# Patient Record
Sex: FEMALE | Race: WHITE | NOT HISPANIC OR LATINO | URBAN - METROPOLITAN AREA
[De-identification: names, ages, dates, MRNs, and addresses within clinical notes are randomized per-mention and may not be internally consistent; named-entity substitution may affect disease eponyms.]

---

## 2017-02-08 ENCOUNTER — MYC MEDICAL ADVICE (OUTPATIENT)
Dept: OBGYN | Facility: CLINIC | Age: 42
End: 2017-02-08

## 2017-02-08 DIAGNOSIS — N91.2 AMENORRHEA: Primary | ICD-10-CM

## 2017-02-09 RX ORDER — MEDROXYPROGESTERONE ACETATE 10 MG
10 TABLET ORAL DAILY
Qty: 7 TABLET | Refills: 2 | Status: SHIPPED | OUTPATIENT
Start: 2017-02-09 | End: 2017-08-30

## 2017-02-09 NOTE — TELEPHONE ENCOUNTER
Notes per Dr. Worrell at last office visit related to diagnosis on 10-28-16:  Plan and Recommendations: I reviewed the condition, causes, differential diagnosis, prognosis, evaluation and management considerations and options.  Questions answered and information given.  See orders.  Advise observe menses now and repeat Provera prn if no menses x 12 weeks and not pregnant to avoid prolonged DUB.      Appears it may have been 15 wks since last period.    Will route to Dr. Worrell for review & orders. Olivia Mcdonald RN, BAN

## 2017-02-09 NOTE — TELEPHONE ENCOUNTER
Advise Provera 10 mg/d x 7d if not pregnant to induce menses. Rx sent with refills. Please notify.   Candido Worrell MD

## 2017-03-22 ENCOUNTER — MYC MEDICAL ADVICE (OUTPATIENT)
Dept: OBGYN | Facility: CLINIC | Age: 42
End: 2017-03-22

## 2017-03-22 NOTE — TELEPHONE ENCOUNTER
I have extended the order for the HSG test. It should still be done and on cycle day 6-8 while abstinent that cycle to do the test. Please notify.   Candido Worrell MD

## 2017-03-22 NOTE — TELEPHONE ENCOUNTER
Noted Dr. Worrell placed orders for HSG on 10-28-16.  Can reply to pt with details but want to make sure that he still wants pt to do the test and order should still be valid as < 6 mos old.    Will route to Dr. Worrell for review & orders. Olivia Mcdonald RN, BAN

## 2017-03-27 ENCOUNTER — RADIANT APPOINTMENT (OUTPATIENT)
Dept: GENERAL RADIOLOGY | Facility: CLINIC | Age: 42
End: 2017-03-27
Attending: OBSTETRICS & GYNECOLOGY
Payer: COMMERCIAL

## 2017-03-27 DIAGNOSIS — Z31.41 FERTILITY TESTING: ICD-10-CM

## 2017-03-27 PROCEDURE — 58340 CATHETER FOR HYSTEROGRAPHY: CPT

## 2017-03-27 PROCEDURE — 74740 X-RAY FEMALE GENITAL TRACT: CPT

## 2017-03-27 RX ORDER — IOPAMIDOL 510 MG/ML
50 INJECTION, SOLUTION INTRAVASCULAR ONCE
Status: COMPLETED | OUTPATIENT
Start: 2017-03-27 | End: 2017-03-27

## 2017-03-27 RX ADMIN — IOPAMIDOL 50 ML: 510 INJECTION, SOLUTION INTRAVASCULAR at 15:02

## 2017-06-30 DIAGNOSIS — J45.40 MODERATE PERSISTENT ASTHMA WITHOUT COMPLICATION: ICD-10-CM

## 2017-06-30 NOTE — TELEPHONE ENCOUNTER
albuterol (ALBUTEROL) 108 (90 BASE) MCG/ACT inhaler       Last Written Prescription Date: 10/11/16  Last Fill Quantity: 1, # refills: 1    Last Office Visit with [facility], [facility] or [facility] prescribing provider:  10/11/16   Future Office Visit:       Date of Last Asthma Action Plan Letter:   Asthma Action Plan Q1 Year    Topic Date Due     Asthma Action Plan - yearly  10/11/2017      Asthma Control Test:   ACT Total Scores 10/11/2016   ACT TOTAL SCORE -   ASTHMA ER VISITS -   ASTHMA HOSPITALIZATIONS -   ACT TOTAL SCORE (Goal Greater than or Equal to 20) 16   In the past 12 months, how many times did you visit the emergency room for your asthma without being admitted to the hospital? 0   In the past 12 months, how many times were you hospitalized overnight because of your asthma? 0       Date of Last Spirometry Test:   No results found for this or any previous visit.            Kanu Faarax  [facility]

## 2017-07-02 RX ORDER — ALBUTEROL SULFATE 90 UG/1
AEROSOL, METERED RESPIRATORY (INHALATION)
Qty: 18 G | Refills: 0 | Status: SHIPPED | OUTPATIENT
Start: 2017-07-02 | End: 2017-11-18

## 2017-07-03 NOTE — TELEPHONE ENCOUNTER
"Patient calling on status of refill. She is leaving for [place] at 7 AM and is concerned she will need albuterol while there secondary to \"allergy season\". Patient misplaced other inhaler. Refilled x 1 inhaler. Per refill protocol.  "

## 2017-08-14 ENCOUNTER — MYC MEDICAL ADVICE (OUTPATIENT)
Dept: OBGYN | Facility: CLINIC | Age: 42
End: 2017-08-14

## 2017-08-14 NOTE — TELEPHONE ENCOUNTER
Noted nl HSG on 03-27-17.   Noted patient was last seen by Dr. Worrell on 10-28-16 for DUB.  Patient sent [employer] message on 02-08-17 for amenorrhea.  Hemoglobin   Date Value Ref Range Status   10/11/2016 12.7 11.7 - 15.7 g/dL Final   ]  This RN wonders if patient needs to be seen in clinic to discuss and get tx plan.  Will route to Dr. Worrell for review & orders. Olivia Mcdonald RN, BAN

## 2017-08-15 NOTE — TELEPHONE ENCOUNTER
It is not clear what has transpired in the past 8 months. As discussed before, advise RE consult and management if still attempting pregnancy. If not pursuing this now, advise return appt here for E&M in the near future. Please notify.   Candido Worrell MD

## 2017-08-28 ENCOUNTER — TELEPHONE (OUTPATIENT)
Dept: OBGYN | Facility: CLINIC | Age: 42
End: 2017-08-28

## 2017-08-28 NOTE — TELEPHONE ENCOUNTER
..Reason for call:  Patient reporting a symptom    Symptom or request: heavy bleeding/menses issues - large clots    Duration (how long have symptoms been present): 2 months    Have you been treated for this before? Yes    Additional comments: hoping to be added on    Phone Number patient can be reached at:  Cell number on file:    Telephone Information:   Mobile [phone]       Best Time:  anytime    Can we leave a detailed message on this number:  YES    Call taken on 8/28/2017 at 9:58 AM by Elisabeth Ochoa

## 2017-08-28 NOTE — TELEPHONE ENCOUNTER
Reviewed chart and recent [employer] messages. Explained patient needs to be seen by Dr. Worrell to determine tx plan. Explained can work her into schedule on 08-30-17. Patient excited and appreciative as she was told no appts until mid September when she called to schedule an appt. Scheduled patient at 1015. Olivia Mcdonald RN, BAN

## 2017-08-30 ENCOUNTER — OFFICE VISIT (OUTPATIENT)
Dept: OBGYN | Facility: CLINIC | Age: 42
End: 2017-08-30
Payer: COMMERCIAL

## 2017-08-30 VITALS
DIASTOLIC BLOOD PRESSURE: 93 MMHG | WEIGHT: 221 LBS | SYSTOLIC BLOOD PRESSURE: 135 MMHG | BODY MASS INDEX: 35.67 KG/M2 | TEMPERATURE: 98.1 F | HEART RATE: 70 BPM

## 2017-08-30 DIAGNOSIS — N92.0 EXCESSIVE OR FREQUENT MENSTRUATION: Primary | ICD-10-CM

## 2017-08-30 DIAGNOSIS — N93.8 DUB (DYSFUNCTIONAL UTERINE BLEEDING): ICD-10-CM

## 2017-08-30 DIAGNOSIS — N91.2 AMENORRHEA: ICD-10-CM

## 2017-08-30 DIAGNOSIS — Z12.4 SCREENING FOR MALIGNANT NEOPLASM OF CERVIX: ICD-10-CM

## 2017-08-30 PROCEDURE — 99214 OFFICE O/P EST MOD 30 MIN: CPT | Performed by: OBSTETRICS & GYNECOLOGY

## 2017-08-30 PROCEDURE — G0145 SCR C/V CYTO,THINLAYER,RESCR: HCPCS | Performed by: OBSTETRICS & GYNECOLOGY

## 2017-08-30 PROCEDURE — G0476 HPV COMBO ASSAY CA SCREEN: HCPCS | Performed by: OBSTETRICS & GYNECOLOGY

## 2017-08-30 RX ORDER — MEDROXYPROGESTERONE ACETATE 10 MG
10 TABLET ORAL DAILY
Qty: 7 TABLET | Refills: 3 | Status: SHIPPED | OUTPATIENT
Start: 2017-08-30 | End: 2018-04-06

## 2017-08-30 NOTE — NURSING NOTE
"Chief Complaint   Patient presents with     Vaginal Bleeding     Follow-up to discuss       Initial BP (!) 135/93 (BP Location: Left arm, Patient Position: Chair, Cuff Size: Adult Regular)  Pulse 70  Temp 98.1  F (36.7  C) (Oral)  Wt 221 lb (100.2 kg)  LMP 08/01/2017  Breastfeeding? No  BMI 35.67 kg/m2 Estimated body mass index is 35.67 kg/(m^2) as calculated from the following:    Height as of 10/28/16: 5' 6\" (1.676 m).    Weight as of this encounter: 221 lb (100.2 kg).  Medication Reconciliation: complete   Estelita Ledezma CMA      "

## 2017-08-30 NOTE — PATIENT INSTRUCTIONS
If you have any questions regarding your visit, Please contact your care team.     [facility]: [phone]    [facility] CLINIC HOURS TELEPHONE NUMBER   HARINI Avila-    Sherley Baker-ZACHARIAH Capone-Medical Assistant   Monday-[place]  8:00a.m-4:45 p.m  Wednesday-[place] 8:00a.m-4:45 p.m.  Thursday-[place]  8:00a.m-4:45 p.m.  Friday-[place]  8:00a.m-4:45 p.m. [facility]  [address]  [place], [place] [address] [phone] ask [facility]  [phone] Fax  Imaging [phone]    [facility] Labor and Delivery  [address] [place] [address] Dr.  [place], [place] [address] [phone]    [facility]  [address] [place] [address]  [phone] ask [facility]  [phone] Fax  Imaging [phone]     Urgent Care locations:    [place]        [place] Monday-Friday  5 pm - 9 pm  Saturday and Sunday   9 am - 5 pm    Monday-Friday   11 am - 9 pm  Saturday and Sunday   9 am - 5 pm   [phone]       If you need a medication refill, please contact your pharmacy. Please allow 3 business days for your refill to be completed.  As always, Thank you for trusting us with your healthcare needs!

## 2017-08-30 NOTE — MR AVS SNAPSHOT
After Visit Summary   8/30/2017    Margarita Pretty    MRN: [id number]           Patient Information     Date Of Birth          1975        Visit Information        Provider Department      8/30/2017 10:15 AM Candido Worrell MD [facility]        Care Instructions                                                        If you have any questions regarding your visit, Please contact your care team.     [facility] Access Services: [phone]    [facility] CLINIC HOURS TELEPHONE NUMBER   Candido Worrell M.D.      Laura-    Sherley Baker-ZACHARIAH Capone-Medical Assistant   Monday-[place]  8:00a.m-4:45 p.m  Wednesday-[place] 8:00a.m-4:45 p.m.  Thursday-[place]  8:00a.m-4:45 p.m.  [facility]  8:00a.m-4:45 p.m. [facility]  [address] [place], [place] [address]J[place]anna  [place], [place] [address] [phone] [facility]  [phone] Fax  Imaging [phone]    [facility] Labor and Delivery  [address] [place] [address] Dr.  [place], [place] [address] [phone]    [facility]  [address] [place], [place] [address] [phone] [facility]  [phone] Fax  Imaging [phone]     Urgent Care locations:    [facility] Monday-Friday  5 pm - 9 pm  Saturday and Sunday   9 am - 5 pm    Monday-Friday   11 am - 9 pm  Saturday and Sunday   9 am - 5 pm   [phone]       If you need a medication refill, please contact your pharmacy. Please allow 3 business days for your refill to be completed.  As always, Thank you for trusting us with your healthcare needs!            Follow-ups after your visit        Your next 10 appointments already scheduled     Aug 30, 2017 10:15 AM CDT   Office Visit with Candido Worrell MD   [facility] ([facility])    [address] [facility] [address]   [phone]           Bring a  current list of meds and any records pertaining to this visit. For Physicals, please bring immunization records and any forms needing to be filled out. Please arrive 10 minutes early to complete paperwork.              Who to contact     If you have questions or need follow up information about today's clinic visit or your schedule please contact [facility] directly at [phone].  Normal or non-critical lab and imaging results will be communicated to you by [employer], letter or phone within 4 business days after the clinic has received the results. If you do not hear from us within 7 days, please contact the clinic through [employer] or phone. If you have a critical or abnormal lab result, we will notify you by phone as soon as possible.  Submit refill requests through [employer] or call your pharmacy and they will forward the refill request to us. Please allow 3 business days for your refill to be completed.          Additional Information About Your Visit        [employer] Information     [employer] gives you secure access to your electronic health record. If you see a primary care provider, you can also send messages to your care team and make appointments. If you have questions, please call your primary care clinic.  If you do not have a primary care provider, please call [phone] and they will assist you.        Care EveryWhere ID     This is your Care EveryWhere ID. This could be used by other organizations to access your [facility] medical records  [id number]        Your Vitals Were     Pulse Temperature Last Period Breastfeeding? BMI (Body Mass Index)       70 98.1  F (36.7  C) (Oral) 08/01/2017 No 35.67 kg/m2        Blood Pressure from Last 3 Encounters:   08/30/17 (!) 135/93   11/10/16 108/71   10/28/16 136/90    Weight from Last 3 Encounters:   08/30/17 221 lb (100.2 kg)   11/10/16 212 lb (96.2 kg)   10/28/16 209 lb (94.8 kg)              Today, you had the following     No orders found for  display       Primary Care Provider Office Phone # Fax #    Demario Bush [phone]       [address]  [facility] [address]        Equal Access to Services     LIZETT CHAVEZ : Hadii aad ku hadasho Soomaali, waaxda luqadaha, qaybta kaalmada adeegyada, waxdylan dickeyn luisa ulloa laLuluart glez. So [facility] [phone].    ATENCIÓN: Si habla español, tiene a mauro disposición servicios gratuitos de asistencia lingüística. Llame al [phone].    We comply with applicable federal civil rights laws and [place] laws. We do not discriminate on the basis of race, color, national origin, age, disability sex, sexual orientation or gender identity.            Thank you!     Thank you for choosing [facility]  for your care. Our goal is always to provide you with excellent care. Hearing back from our patients is one way we can continue to improve our services. Please take a few minutes to complete the written survey that you may receive in the mail after your visit with us. Thank you!             Your Updated Medication List - Protect others around you: Learn how to safely use, store and throw away your medicines at www.disposemymeds.org.          This list is accurate as of: 8/30/17 10:09 AM.  Always use your most recent med list.                   Brand Name Dispense Instructions for use Diagnosis    * albuterol (2.5 MG/3ML) 0.083% neb solution     60 vial    Take 1 vial (2.5 mg) by nebulization every 6 hours as needed for shortness of breath / dyspnea    Moderate persistent asthma       * VENTOLIN  (90 BASE) MCG/ACT Inhaler   Generic drug:  albuterol     18 g    INHALE 2 PUFFS INTO THE LUNGS EVERY 4 HOURS AS NEEDED FOR ASTHMA SYMPTOMS    Moderate persistent asthma without complication       beclomethasone 40 MCG/ACT Inhaler    QVAR    1 Inhaler    Inhale 2 puffs into the lungs 2 times daily for asthma prevention.    Moderate persistent asthma without complication        cetirizine 10 MG tablet    zyrTEC     Take 1 tablet by mouth every evening.        flunisolide HFA 80 MCG/ACT Aers oral inhaler    AEROSPAN    1 Inhaler    Inhale 2 puffs into the lungs 2 times daily for asthma prevention.    Moderate persistent asthma without complication       IBUPROFEN PO      Take 200 mg by mouth        medroxyPROGESTERone 10 MG tablet    PROVERA    7 tablet    Take 1 tablet (10 mg) by mouth daily    Amenorrhea       * Notice:  This list has 2 medication(s) that are the same as other medications prescribed for you. Read the directions carefully, and ask your doctor or other care provider to review them with you.

## 2017-08-31 ENCOUNTER — MYC MEDICAL ADVICE (OUTPATIENT)
Dept: OBGYN | Facility: CLINIC | Age: 42
End: 2017-08-31

## 2017-08-31 NOTE — PROGRESS NOTES
OB-GYN Problem-Oriented Visit or Consultation      Margarita Pretty is a 42 year old year old P 0 who presents with a chief complaint of menorrhagia.  Referred by self.  Patient's last menstrual period was 08/01/2017.    HPI:     See prior notes. No recurrent Bartholin abscess. Light smoker still and encouraged cessation. Stable relationship and open to pregnancy but infertility treatment too costly. Oligomenorrhea and dependent on Provera generally to induce menses. LNMP 3/27/17. Started spotting 6 weeks ago and this increased to heavy bleeding this past week and this has now stopped x 1 day. Contraceptive method is none.     Past medical, obstetrical, surgical, family and social history reviewed and as noted or updated in chart.     Allergies, meds and supplements are as noted or updated in chart.      ROS:   Systems reviewed include:  constitutional, gastrointestinal, genitourinary, psychological, hematologic/lymphatic and endocrine.    These systems were negative for significant symptoms except for the following additional: none; see HPI.    EXAM:  VS as noted.   Abd and Pelvis were  normal or negative except for, or in particular noting, the following  pertinent findings: none.      Assessment:   Encounter Diagnoses   Name Primary?     Excessive or frequent menstruation Yes     Screening for malignant neoplasm of cervix      Amenorrhea      DUB (dysfunctional uterine bleeding)      Plan and Recommendations: I reviewed the condition, causes, differential diagnosis, prognosis, evaluation and management considerations and options.  Questions answered and information given.  See orders.  Pregnancy doubtful. EMB deferred for now. Previous normal u/s and HSG.   Cyclic Provera now in 1 and then every 2-3 mo prn and if not pregnant.   Medications and prescriptions given as noted.  I reviewed side effects, risks, benefits and instructions on proper use.  Continue regular exams.      A/P:  Margarita was seen today for vaginal  bleeding.    Diagnoses and all orders for this visit:    Excessive or frequent menstruation    Screening for malignant neoplasm of cervix  -     Pap imaged thin layer screen with HPV - recommended age 30 - 65 years (select HPV order below)  -     HPV High Risk Types DNA Cervical    Amenorrhea  -     medroxyPROGESTERone (PROVERA) 10 MG tablet; Take 1 tablet (10 mg) by mouth daily    DUB (dysfunctional uterine bleeding)        Candido Worrell MD

## 2017-08-31 NOTE — TELEPHONE ENCOUNTER
Please indicate in a letter that patient has chronic problems with dysfunctional uterine bleeding requiring on-going medical treatment and desires to continue her care at [facility] with me and her other physicians and should benefit from this continuity of care. We would appreciate a waiver of her network restrictions to allow continued care here. Thank you.      Let patient know and send letter as indicated.   I had also sent the Provera prescription for use as directed.   Candido Worrell MD

## 2017-08-31 NOTE — LETTER
09/01/17    Margarita Adameud  [address]  [place] [address]      To whom it may concern:      Margarita has chronic problems with dysfunctional uterine bleeding requiring on-going medical treatment and desires to continue her care at [facility] with me and her other physicians and should benefit from this continuity of care. We would appreciate a waiver of her network restrictions to allow continued care here. Thank you.          Sincerely,      Candido Worerll MD

## 2017-08-31 NOTE — LETTER
09/01/17    Margarita Adameud  [address]  [place] [address]      To whom it may concern:  Margarita has chronic problems with dysfunctional uterine bleeding requiring on-going medical treatment and desires to continue her care at [facility] with me and her other physicians and should benefit from this continuity of care. We would appreciate a waiver of her network restrictions to allow continued care here. Thank you.          Sincerely,      Candido Worrell MD

## 2017-09-01 LAB
COPATH REPORT: NORMAL
PAP: NORMAL

## 2017-09-06 LAB
FINAL DIAGNOSIS: NORMAL
HPV HR 12 DNA CVX QL NAA+PROBE: NEGATIVE
HPV16 DNA SPEC QL NAA+PROBE: NEGATIVE
HPV18 DNA SPEC QL NAA+PROBE: NEGATIVE
SPECIMEN DESCRIPTION: NORMAL

## 2017-11-18 DIAGNOSIS — J45.40 MODERATE PERSISTENT ASTHMA WITHOUT COMPLICATION: ICD-10-CM

## 2017-11-21 NOTE — TELEPHONE ENCOUNTER
Routing refill request to provider for review/approval because:  Patient needs to be seen because it has been more than 1 year since last office visit.  Act is due      Keiry Fonseca RN - BC

## 2017-11-21 NOTE — TELEPHONE ENCOUNTER
Let patient know she needs to be seen for further refills. (failing Asthma and hasn't been seen for over a year)

## 2017-11-22 RX ORDER — ALBUTEROL SULFATE 90 UG/1
AEROSOL, METERED RESPIRATORY (INHALATION)
Qty: 18 G | Refills: 11 | Status: SHIPPED | OUTPATIENT
Start: 2017-11-22

## 2017-11-22 NOTE — TELEPHONE ENCOUNTER
Call to patient to inform that her  Provider would like her seen in clinic before refill of   Medication.     She says that she does not have insurance coverage at this time.     She is out of the medication and wondering what she should do, until she can get in.       Johanna Blake CMA

## 2017-11-22 NOTE — TELEPHONE ENCOUNTER
Spoke to patient inhaler was refilled and she will schedule an appointment in the next month when she gets insurance.  Marilee GILLIS

## 2017-12-26 ENCOUNTER — MYC MEDICAL ADVICE (OUTPATIENT)
Dept: OBGYN | Facility: CLINIC | Age: 42
End: 2017-12-26

## 2018-01-09 ENCOUNTER — RADIANT APPOINTMENT (OUTPATIENT)
Dept: MAMMOGRAPHY | Facility: CLINIC | Age: 43
End: 2018-01-09
Attending: OBSTETRICS & GYNECOLOGY
Payer: COMMERCIAL

## 2018-01-09 DIAGNOSIS — Z12.31 VISIT FOR SCREENING MAMMOGRAM: ICD-10-CM

## 2018-01-09 PROCEDURE — 77063 BREAST TOMOSYNTHESIS BI: CPT

## 2018-01-09 PROCEDURE — 77067 SCR MAMMO BI INCL CAD: CPT

## 2018-01-22 ENCOUNTER — APPOINTMENT (OUTPATIENT)
Dept: OPTOMETRY | Facility: CLINIC | Age: 43
End: 2018-01-22
Payer: COMMERCIAL

## 2018-01-22 ENCOUNTER — OFFICE VISIT (OUTPATIENT)
Dept: OPTOMETRY | Facility: CLINIC | Age: 43
End: 2018-01-22
Payer: COMMERCIAL

## 2018-01-22 DIAGNOSIS — H04.123 INSUFFICIENCY OF TEAR FILM OF BOTH EYES: ICD-10-CM

## 2018-01-22 DIAGNOSIS — H52.13 MYOPIA OF BOTH EYES: Primary | ICD-10-CM

## 2018-01-22 DIAGNOSIS — H10.13 ALLERGIC CONJUNCTIVITIS OF BOTH EYES: ICD-10-CM

## 2018-01-22 PROCEDURE — 92340 FIT SPECTACLES MONOFOCAL: CPT | Performed by: OPTOMETRIST

## 2018-01-22 PROCEDURE — 92015 DETERMINE REFRACTIVE STATE: CPT | Performed by: OPTOMETRIST

## 2018-01-22 PROCEDURE — 92004 COMPRE OPH EXAM NEW PT 1/>: CPT | Performed by: OPTOMETRIST

## 2018-01-22 RX ORDER — AZELASTINE HYDROCHLORIDE 0.5 MG/ML
1 SOLUTION/ DROPS OPHTHALMIC 2 TIMES DAILY
Qty: 1 BOTTLE | Refills: 6 | Status: SHIPPED | OUTPATIENT
Start: 2018-01-22

## 2018-01-22 ASSESSMENT — VISUAL ACUITY
OD_SC: 20/20-1
METHOD: SNELLEN - LINEAR
OS_PH_SC: 20/20-2
OS_SC: 20/60
OS_SC: 20/20-1
OD_SC: 20/30-2

## 2018-01-22 ASSESSMENT — SLIT LAMP EXAM - LIDS
COMMENTS: SLIGHT PTOSIS
COMMENTS: NORMAL

## 2018-01-22 ASSESSMENT — EXTERNAL EXAM - RIGHT EYE: OD_EXAM: NORMAL

## 2018-01-22 ASSESSMENT — REFRACTION_MANIFEST
OS_SPHERE: -1.25
OD_CYLINDER: +0.25
OS_CYLINDER: SPHERE
OD_AXIS: 060
OD_SPHERE: -1.00

## 2018-01-22 ASSESSMENT — CONF VISUAL FIELD
OD_NORMAL: 1
OS_NORMAL: 1

## 2018-01-22 ASSESSMENT — TONOMETRY
OD_IOP_MMHG: 17
OS_IOP_MMHG: 17
IOP_METHOD: TONOPEN

## 2018-01-22 ASSESSMENT — CUP TO DISC RATIO
OD_RATIO: 0.2
OS_RATIO: 0.2

## 2018-01-22 ASSESSMENT — EXTERNAL EXAM - LEFT EYE: OS_EXAM: NORMAL

## 2018-01-22 NOTE — MR AVS SNAPSHOT
After Visit Summary   1/22/2018    Margarita Pretty    MRN: [id number]           Patient Information     Date Of Birth          1975        Visit Information        Provider Department      1/22/2018 4:00 PM Eric Zepeda, OD [facility]        Today's Diagnoses     Myopia of both eyes    -  1    Allergic conjunctivitis of both eyes        Insufficiency of tear film of both eyes          Care Instructions    Eyeglass prescription given.  Glasses for distance vision only.    Optivar- 1 drop both eyes 2 x day as needed for itchy eyes.    Systane Ultra 1 drop both eyes 3 x day- 4 x day daily.    Return in 1 year for a complete eye exam or sooner if needed.    Eric Zepeda, TY              Follow-ups after your visit        Follow-up notes from your care team     Return in about 1 year (around 1/22/2019) for Annual Visit.      Who to contact     If you have questions or need follow up information about today's clinic visit or your schedule please contact [facility] directly at [phone].  Normal or non-critical lab and imaging results will be communicated to you by Codasystemhart, letter or phone within 4 business days after the clinic has received the results. If you do not hear from us within 7 days, please contact the clinic through Triboldt or phone. If you have a critical or abnormal lab result, we will notify you by phone as soon as possible.  Submit refill requests through Zoodak or call your pharmacy and they will forward the refill request to us. Please allow 3 business days for your refill to be completed.          Additional Information About Your Visit        Codasystemhart Information     Zoodak gives you secure access to your electronic health record. If you see a primary care provider, you can also send messages to your care team and make appointments. If you have questions, please call your primary care clinic.  If you do not have a primary care provider,  please call [phone] and they will assist you.        Care EveryWhere ID     This is your Care EveryWhere ID. This could be used by other organizations to access your [place] medical records  [id number]         Blood Pressure from Last 3 Encounters:   08/30/17 (!) 135/93   11/10/16 108/71   10/28/16 136/90    Weight from Last 3 Encounters:   08/30/17 100.2 kg (221 lb)   11/10/16 96.2 kg (212 lb)   10/28/16 94.8 kg (209 lb)              We Performed the Following     EYE EXAM (SIMPLE-NONBILLABLE)     REFRACTION          Today's Medication Changes          These changes are accurate as of: 1/22/18  5:15 PM.  If you have any questions, ask your nurse or doctor.               Start taking these medicines.        Dose/Directions    azelastine 0.05 % Soln ophthalmic solution   Commonly known as:  OPTIVAR   Used for:  Allergic conjunctivitis of both eyes   Started by:  Eric Zepeda, OD        Dose:  1 drop   Apply 1 drop to eye 2 times daily   Quantity:  1 Bottle   Refills:  6       polyethylene glycol 0.4%- propylene glycol 0.3% 0.4-0.3 % Soln ophthalmic solution   Commonly known as:  SYSTANE ULTRA   Used for:  Insufficiency of tear film of both eyes   Started by:  Eric Zepeda, OD        Dose:  1 drop   Place 1 drop into both eyes 4 times daily   Quantity:  6 mL   Refills:  12            Where to get your medicines      [facility] [address] [place] [address] [address] [place] [address] AT [facility]  [address] [facility] [address]    Hours:  24-hours Phone:  [phone]     azelastine 0.05 % Soln ophthalmic solution    polyethylene glycol 0.4%- propylene glycol 0.3% 0.4-0.3 % Soln ophthalmic solution                Primary Care Provider Office Phone # Fax #    Demario Bush [phone]       [address]  [place] [address]        Equal Access to Services     LIZETT CHAVEZ AH: tari Vital  sergeama moustapha jerome. So [place] [phone].    ATENCIÓN: Si anahi de la garza, tiene a mauro disposición servicios gratuitos de asistencia lingüística. Tay al [phone].    We comply with applicable federal civil rights laws and [place] laws. We do not discriminate on the basis of race, color, national origin, age, disability, sex, sexual orientation, or gender identity.            Thank you!     Thank you for choosing [facility]  for your care. Our goal is always to provide you with excellent care. Hearing back from our patients is one way we can continue to improve our services. Please take a few minutes to complete the written survey that you may receive in the mail after your visit with us. Thank you!             Your Updated Medication List - Protect others around you: Learn how to safely use, store and throw away your medicines at www.disposemymeds.org.          This list is accurate as of: 1/22/18  5:15 PM.  Always use your most recent med list.                   Brand Name Dispense Instructions for use Diagnosis    * albuterol (2.5 MG/3ML) 0.083% neb solution     60 vial    Take 1 vial (2.5 mg) by nebulization every 6 hours as needed for shortness of breath / dyspnea    Moderate persistent asthma       * VENTOLIN  (90 BASE) MCG/ACT Inhaler   Generic drug:  albuterol     18 g    INHALE 2 PUFFS INTO THE LUNGS EVERY 4 HOURS AS NEEDED FOR ASTHMA SYMPTOMS    Moderate persistent asthma without complication       azelastine 0.05 % Soln ophthalmic solution    OPTIVAR    1 Bottle    Apply 1 drop to eye 2 times daily    Allergic conjunctivitis of both eyes       beclomethasone 40 MCG/ACT Inhaler    QVAR    1 Inhaler    Inhale 2 puffs into the lungs 2 times daily for asthma prevention.    Moderate persistent asthma without complication       cetirizine 10 MG tablet    zyrTEC     Take 1 tablet by mouth every evening.        flunisolide  HFA 80 MCG/ACT Aers oral inhaler    AEROSPAN    1 Inhaler    Inhale 2 puffs into the lungs 2 times daily for asthma prevention.    Moderate persistent asthma without complication       IBUPROFEN PO      Take 200 mg by mouth        medroxyPROGESTERone 10 MG tablet    PROVERA    7 tablet    Take 1 tablet (10 mg) by mouth daily    Amenorrhea       polyethylene glycol 0.4%- propylene glycol 0.3% 0.4-0.3 % Soln ophthalmic solution    SYSTANE ULTRA    6 mL    Place 1 drop into both eyes 4 times daily    Insufficiency of tear film of both eyes       * Notice:  This list has 2 medication(s) that are the same as other medications prescribed for you. Read the directions carefully, and ask your doctor or other care provider to review them with you.
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
## 2018-01-22 NOTE — PATIENT INSTRUCTIONS
Eyeglass prescription given.  Glasses for distance vision only.    Optivar- 1 drop both eyes 2 x day as needed for itchy eyes.    Systane Ultra 1 drop both eyes 3 x day- 4 x day daily.    Return in 1 year for a complete eye exam or sooner if needed.    Eric Zepeda, OD
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
## 2018-04-06 ENCOUNTER — MYC MEDICAL ADVICE (OUTPATIENT)
Dept: OBGYN | Facility: CLINIC | Age: 43
End: 2018-04-06

## 2018-04-06 DIAGNOSIS — N91.2 AMENORRHEA: ICD-10-CM

## 2018-04-06 RX ORDER — MEDROXYPROGESTERONE ACETATE 10 MG
10 TABLET ORAL DAILY
Qty: 7 TABLET | Refills: 2 | Status: SHIPPED | OUTPATIENT
Start: 2018-04-06

## 2018-04-06 NOTE — TELEPHONE ENCOUNTER
Routed to Dr Worrell. Pharmacy loaded into system for [place].   Patient requesting refill as she is spending a couple of months there.    Last visit with Dr Worrell was 08/30/2017. No future appts made.  medroxyPROGESTERone (PROVERA) 10 MG tablet 7 tablet 3 8/30/2017  No   Sig: Take 1 tablet (10 mg) by mouth daily     Jeannine Ghosh RN

## 2020-02-10 ENCOUNTER — HEALTH MAINTENANCE LETTER (OUTPATIENT)
Age: 45
End: 2020-02-10

## 2020-11-16 ENCOUNTER — HEALTH MAINTENANCE LETTER (OUTPATIENT)
Age: 45
End: 2020-11-16

## 2021-04-03 ENCOUNTER — HEALTH MAINTENANCE LETTER (OUTPATIENT)
Age: 46
End: 2021-04-03

## 2021-09-18 ENCOUNTER — HEALTH MAINTENANCE LETTER (OUTPATIENT)
Age: 46
End: 2021-09-18

## 2022-04-30 ENCOUNTER — HEALTH MAINTENANCE LETTER (OUTPATIENT)
Age: 47
End: 2022-04-30

## 2022-11-19 ENCOUNTER — HEALTH MAINTENANCE LETTER (OUTPATIENT)
Age: 47
End: 2022-11-19

## 2023-06-01 ENCOUNTER — HEALTH MAINTENANCE LETTER (OUTPATIENT)
Age: 48
End: 2023-06-01